# Patient Record
Sex: FEMALE | Race: WHITE | NOT HISPANIC OR LATINO | Employment: FULL TIME | URBAN - METROPOLITAN AREA
[De-identification: names, ages, dates, MRNs, and addresses within clinical notes are randomized per-mention and may not be internally consistent; named-entity substitution may affect disease eponyms.]

---

## 2023-10-16 ENCOUNTER — TELEPHONE (OUTPATIENT)
Dept: RHEUMATOLOGY | Facility: CLINIC | Age: 63
End: 2023-10-16

## 2023-10-16 DIAGNOSIS — M05.741 RHEUMATOID ARTHRITIS INVOLVING BOTH HANDS WITH POSITIVE RHEUMATOID FACTOR (MULTI): Primary | ICD-10-CM

## 2023-10-16 DIAGNOSIS — M05.742 RHEUMATOID ARTHRITIS INVOLVING BOTH HANDS WITH POSITIVE RHEUMATOID FACTOR (MULTI): Primary | ICD-10-CM

## 2023-10-16 NOTE — TELEPHONE ENCOUNTER
Patient called for a refill on   Methotrexate 2.5mg #120 take 10 tablets by mouth weekly    Shilo Marino Federico [phone]

## 2023-10-17 DIAGNOSIS — M05.742 RHEUMATOID ARTHRITIS INVOLVING BOTH HANDS WITH POSITIVE RHEUMATOID FACTOR (MULTI): Primary | ICD-10-CM

## 2023-10-17 DIAGNOSIS — M05.741 RHEUMATOID ARTHRITIS INVOLVING BOTH HANDS WITH POSITIVE RHEUMATOID FACTOR (MULTI): Primary | ICD-10-CM

## 2023-10-18 DIAGNOSIS — M05.79 RHEUMATOID ARTHRITIS INVOLVING MULTIPLE SITES WITH POSITIVE RHEUMATOID FACTOR (MULTI): Primary | ICD-10-CM

## 2023-10-18 RX ORDER — FOLIC ACID 1 MG/1
1 TABLET ORAL DAILY
COMMUNITY
Start: 2020-09-09 | End: 2023-10-18 | Stop reason: SDUPTHER

## 2023-10-18 RX ORDER — METHOTREXATE 2.5 MG/1
TABLET ORAL
Qty: 12 TABLET | Refills: 0 | Status: SHIPPED | OUTPATIENT
Start: 2023-10-18 | End: 2023-11-29 | Stop reason: SDUPTHER

## 2023-10-18 RX ORDER — HYDROXYCHLOROQUINE SULFATE 200 MG/1
1 TABLET, FILM COATED ORAL
COMMUNITY
End: 2023-11-29 | Stop reason: SDUPTHER

## 2023-10-18 RX ORDER — METHOTREXATE 2.5 MG/1
TABLET ORAL
COMMUNITY
End: 2023-10-18 | Stop reason: SDUPTHER

## 2023-10-18 RX ORDER — PREDNISONE 10 MG/1
5 TABLET ORAL DAILY
COMMUNITY
End: 2024-04-17 | Stop reason: WASHOUT

## 2023-10-18 RX ORDER — PENICILLIN V POTASSIUM 500 MG/1
TABLET, FILM COATED ORAL
COMMUNITY
Start: 2022-11-16

## 2023-10-18 RX ORDER — IBUPROFEN 800 MG/1
800 TABLET ORAL EVERY 8 HOURS PRN
COMMUNITY
Start: 2022-11-16

## 2023-10-18 RX ORDER — FOLIC ACID 1 MG/1
1 TABLET ORAL DAILY
Qty: 90 TABLET | Refills: 0 | Status: SHIPPED | OUTPATIENT
Start: 2023-10-18 | End: 2024-01-16

## 2023-10-18 RX ORDER — AMOXICILLIN 500 MG/1
CAPSULE ORAL
COMMUNITY
Start: 2022-11-08

## 2023-10-18 RX ORDER — METHOTREXATE 2.5 MG/1
TABLET ORAL
Qty: 12 TABLET | Refills: 0 | Status: SHIPPED | OUTPATIENT
Start: 2023-10-18 | End: 2023-10-18 | Stop reason: SDUPTHER

## 2023-10-19 RX ORDER — METHOTREXATE 2.5 MG/1
TABLET ORAL
Qty: 120 TABLET | Refills: 0 | Status: SHIPPED | OUTPATIENT
Start: 2023-10-19 | End: 2024-01-17

## 2023-10-30 ENCOUNTER — APPOINTMENT (OUTPATIENT)
Dept: RHEUMATOLOGY | Facility: CLINIC | Age: 63
End: 2023-10-30

## 2023-11-29 ENCOUNTER — OFFICE VISIT (OUTPATIENT)
Dept: RHEUMATOLOGY | Facility: CLINIC | Age: 63
End: 2023-11-29

## 2023-11-29 DIAGNOSIS — M05.741 RHEUMATOID ARTHRITIS INVOLVING BOTH HANDS WITH POSITIVE RHEUMATOID FACTOR (MULTI): Primary | ICD-10-CM

## 2023-11-29 DIAGNOSIS — M05.742 RHEUMATOID ARTHRITIS INVOLVING BOTH HANDS WITH POSITIVE RHEUMATOID FACTOR (MULTI): Primary | ICD-10-CM

## 2023-11-29 PROCEDURE — 1036F TOBACCO NON-USER: CPT | Performed by: INTERNAL MEDICINE

## 2023-11-29 PROCEDURE — 99213 OFFICE O/P EST LOW 20 MIN: CPT | Performed by: INTERNAL MEDICINE

## 2023-11-29 RX ORDER — HYDROXYCHLOROQUINE SULFATE 200 MG/1
200 TABLET, FILM COATED ORAL 2 TIMES DAILY
Qty: 180 TABLET | Refills: 0 | Status: SHIPPED | OUTPATIENT
Start: 2023-11-29 | End: 2024-03-22 | Stop reason: SDUPTHER

## 2023-11-29 ASSESSMENT — ENCOUNTER SYMPTOMS
LOSS OF SENSATION IN FEET: 0
OCCASIONAL FEELINGS OF UNSTEADINESS: 0
DEPRESSION: 0

## 2023-11-29 NOTE — PATIENT INSTRUCTIONS
Take methotrexate 8 pills/week.  Continue hydroxychloroquine 1 pill twice a day.  Call me if any question.  Follow-up in 4 months.

## 2023-11-29 NOTE — PROGRESS NOTES
"Subjective   Patient ID: Thais Kenny is a 63 y.o. female who presents for Follow-up.    HPI.  63-year-old female with history of seropositive RA presented for follow-up.    She stated that she is feeling fine most of the time however once in a while notes mild soreness and stiffness in the hands.    Immunosuppression: Hydroxychloroquine and methotrexate (9/2020).     Review of Systems   All other systems reviewed and are negative.    Objective .      6/9/2021     3:00 PM 9/8/2021     3:40 PM 3/9/2022     3:03 PM 7/13/2022     3:04 PM 11/9/2022     2:51 PM 3/20/2023     3:36 PM 7/17/2023     3:22 PM   Vitals   Systolic 130 156 142 147  132 134   Diastolic 84 80 80 94  78 79   Heart Rate    91  89 96   Height (in) 1.6 m (5' 3\")    1.6 m (5' 3\") 1.6 m (5' 3\") 1.6 m (5' 3\")   Weight (lb) 248   248 245 246 255   BMI 43.93 kg/m2   43.93 kg/m2 43.4 kg/m2 43.58 kg/m2 45.17 kg/m2   BSA (m2) 2.23 m2   2.23 m2 2.22 m2 2.23 m2 2.27 m2      Physical Exam.  Gen. AAO x3, NAD.  HEENT: No pallor or icterus, PERRLA, EOMI. Oropharynx is clear. MM moist,Parotid glands  not enlarged. No cervical lymphadenopathy .  Skin: No rashes.  Heart: S1, S2/ RRR.   Lungs: CTA B.  Abdomen: Soft, NT/ND.  MSK: No swollen or tender joint.  Mild chronic synovial proliferation of the MCP joints with slight ulnar drifting of the digits at the MCP joints.  Handgrip excellent.   Neuro:  Sensation to touch intact.Strength 5/5 throughout.   Psych:Appropriate mood and behavior  EXT: No edema    Assessment/Plan     63-year-old female with history of seropositive RA presented for follow-up.    #1: Seropositive RA.  Chronic mild synovial proliferation of the MCP joints without active synovitis.  Labs obtained at [facility] about 1 week ago showed ESR of 31 and CRP 8.6.    -Discussed to take methotrexate 20 mg/week.  -Continue hydroxychloroquine twice a day.  Eye exam up to date.    Follow-up in 4 months.     This note was partially generated using the Dragon " Voice recognition system. There may be some incorrect wording, spelling and/or spelling errors or punctuation errors that were not corrected prior to committing the note to the medical record.    There is no problem list on file for this patient.     Past Surgical History:   Procedure Laterality Date    OTHER SURGICAL HISTORY  02/08/2019    Cholecystectomy laparoscopic      Social History     Tobacco Use    Smoking status: Never    Smokeless tobacco: Never   Substance Use Topics    Alcohol use: Not Currently     Comment: VERY RARE      No family history on file.   No Known Allergies   Current Outpatient Medications   Medication Instructions    amoxicillin (Amoxil) 500 mg capsule TAKE ONE CAPSULE BY MOUTH FOUR TIMES A DAY UNTIL GONE    folic acid (FOLVITE) 1 mg, oral, Daily    hydroxychloroquine (PLAQUENIL) 200 mg, oral, 2 times daily, Take with food.     mg, oral, Every 8 hours PRN    methotrexate (Trexall) 2.5 mg tablet TAKE 10 TABLETS BY MOUTH EVERY WEEK    penicillin v potassium (Veetid) 500 mg tablet TAKE ONE TABLET BY MOUTH FOUR TIMES A DAY UNTIL GONE    predniSONE (DELTASONE) 5 mg, oral, Daily

## 2024-01-30 ENCOUNTER — TELEPHONE (OUTPATIENT)
Dept: PRIMARY CARE | Facility: CLINIC | Age: 64
End: 2024-01-30

## 2024-01-30 DIAGNOSIS — M05.742 RHEUMATOID ARTHRITIS INVOLVING BOTH HANDS WITH POSITIVE RHEUMATOID FACTOR (MULTI): Primary | ICD-10-CM

## 2024-01-30 DIAGNOSIS — M05.741 RHEUMATOID ARTHRITIS INVOLVING BOTH HANDS WITH POSITIVE RHEUMATOID FACTOR (MULTI): Primary | ICD-10-CM

## 2024-01-30 RX ORDER — METHOTREXATE 2.5 MG/1
20 TABLET ORAL
Qty: 96 TABLET | Refills: 0 | Status: SHIPPED | OUTPATIENT
Start: 2024-01-30 | End: 2024-04-17 | Stop reason: SDUPTHER

## 2024-01-30 NOTE — TELEPHONE ENCOUNTER
LEYDA jack     Pt states she needs refill on Methotrexate    I dont see this in her med list - is she still taking this?

## 2024-03-22 ENCOUNTER — TELEPHONE (OUTPATIENT)
Dept: PRIMARY CARE | Facility: CLINIC | Age: 64
End: 2024-03-22

## 2024-03-22 DIAGNOSIS — M05.742 RHEUMATOID ARTHRITIS INVOLVING BOTH HANDS WITH POSITIVE RHEUMATOID FACTOR (MULTI): ICD-10-CM

## 2024-03-22 DIAGNOSIS — M05.741 RHEUMATOID ARTHRITIS INVOLVING BOTH HANDS WITH POSITIVE RHEUMATOID FACTOR (MULTI): ICD-10-CM

## 2024-03-22 RX ORDER — HYDROXYCHLOROQUINE SULFATE 200 MG/1
200 TABLET, FILM COATED ORAL 2 TIMES DAILY
Qty: 180 TABLET | Refills: 0 | Status: SHIPPED | OUTPATIENT
Start: 2024-03-22 | End: 2024-06-20

## 2024-03-22 NOTE — TELEPHONE ENCOUNTER
Patient called for a refill on    Hydroxychloroquine 200mg #180 take one tablet 2 times a day with food    Shilo Marino [phone]

## 2024-04-17 ENCOUNTER — OFFICE VISIT (OUTPATIENT)
Dept: RHEUMATOLOGY | Facility: CLINIC | Age: 64
End: 2024-04-17

## 2024-04-17 VITALS — WEIGHT: 255 LBS | BODY MASS INDEX: 45.17 KG/M2

## 2024-04-17 DIAGNOSIS — M05.741 RHEUMATOID ARTHRITIS INVOLVING BOTH HANDS WITH POSITIVE RHEUMATOID FACTOR (MULTI): ICD-10-CM

## 2024-04-17 DIAGNOSIS — M05.742 RHEUMATOID ARTHRITIS INVOLVING BOTH HANDS WITH POSITIVE RHEUMATOID FACTOR (MULTI): ICD-10-CM

## 2024-04-17 PROCEDURE — 99213 OFFICE O/P EST LOW 20 MIN: CPT | Performed by: INTERNAL MEDICINE

## 2024-04-17 PROCEDURE — 1036F TOBACCO NON-USER: CPT | Performed by: INTERNAL MEDICINE

## 2024-04-17 RX ORDER — METHOTREXATE 2.5 MG/1
20 TABLET ORAL
Qty: 96 TABLET | Refills: 0 | Status: SHIPPED | OUTPATIENT
Start: 2024-04-21 | End: 2024-07-20

## 2024-04-17 NOTE — PATIENT INSTRUCTIONS
Continue hydroxychloroquine and methotrexate as prescribed.  Call me if any question.  Follow-up in 4 months.

## 2024-04-17 NOTE — PROGRESS NOTES
Subjective  . Thais Kenny is a 63 y.o. female who presents for     Manish Gonzalez MD  Physician Specialty: Rheumatology   Pro and New Patient Visit.    HPI.63-year-old female with history of seropositive RA and positive MAURISIO presented for follow-up.   He stated that she is feeling fine.  She has no complaints.    Immunosuppression: Hydroxychloroquine and methotrexate (9/2020).       Review of Systems   All other systems reviewed and are negative.    Objective     Weight 116 kg (255 lb).    Physical Exam.  Gen. AAO x3, NAD.  HEENT: No pallor or icterus, PERRLA, EOMI. Parotid glands  not enlarged. No cervical lymphadenopathy .  Skin: No rashes.  Heart: S1, S2/ RRR.   Lungs: CTA B.  Abdomen: Soft, NT/ND, BS regular.  MSK: No swollen or tender joint.  Neuro: Sensation to touch intact.Strength 5/5 throughout.   Psych:Appropriate mood and behavior  EXT: No edema       Assessment/Plan  . 63-year-old female with history of seropositive RA and positive MAURISIO presented for follow-up.     #1: Seropositive RA.  Stable.  -Continue methotrexate 20 mg/week.  -Continue hydroxychloroquine twice a day.  -Labs reviewed.    Follow-up in 4 months.     This note was partially generated using the Dragon Voice recognition system. There may be some incorrect wording, spelling and/or spelling errors or punctuation errors that were not corrected prior to committing the note to the medical record.    Problem List Items Addressed This Visit    None  Visit Diagnoses       Rheumatoid arthritis involving both hands with positive rheumatoid factor (Multi)        Relevant Medications    methotrexate (Trexall) 2.5 mg tablet (Start on 4/21/2024)                 Active Ambulatory Problems     Diagnosis Date Noted    No Active Ambulatory Problems     Resolved Ambulatory Problems     Diagnosis Date Noted    No Resolved Ambulatory Problems     Past Medical History:   Diagnosis Date    Personal history of other diseases of the musculoskeletal system and  connective tissue        No family history on file.    Past Surgical History:   Procedure Laterality Date    OTHER SURGICAL HISTORY  02/08/2019    Cholecystectomy laparoscopic       Social History     Tobacco Use   Smoking Status Never   Smokeless Tobacco Never       Allergies  Patient has no known allergies.    Current Meds  Current Outpatient Medications   Medication Instructions    amoxicillin (Amoxil) 500 mg capsule TAKE ONE CAPSULE BY MOUTH FOUR TIMES A DAY UNTIL GONE    hydroxychloroquine (PLAQUENIL) 200 mg, oral, 2 times daily, Take with food.     mg, oral, Every 8 hours PRN    [START ON 4/21/2024] methotrexate (TREXALL) 20 mg, oral, Once Weekly, Follow directions carefully, and ask to explain any part you do not understand. Take exactly as directed.    penicillin v potassium (Veetid) 500 mg tablet TAKE ONE TABLET BY MOUTH FOUR TIMES A DAY UNTIL GONE        Lab Results   Component Value Date    ANATITER 1:320 03/18/2019    SEDRATE 18 03/18/2019    CRP 1.77 (A) 03/18/2019    DNADS <1.0 03/18/2019             Manish Gonzalez MD

## 2024-06-21 ENCOUNTER — TELEPHONE (OUTPATIENT)
Dept: RHEUMATOLOGY | Facility: CLINIC | Age: 64
End: 2024-06-21

## 2024-06-21 DIAGNOSIS — M05.742 RHEUMATOID ARTHRITIS INVOLVING BOTH HANDS WITH POSITIVE RHEUMATOID FACTOR (MULTI): ICD-10-CM

## 2024-06-21 DIAGNOSIS — M05.741 RHEUMATOID ARTHRITIS INVOLVING BOTH HANDS WITH POSITIVE RHEUMATOID FACTOR (MULTI): ICD-10-CM

## 2024-06-21 NOTE — TELEPHONE ENCOUNTER
Patient called for a refill on    Methotrexate 2.5mg #96 take 8 tablets one time a week per week. Follow directions carefully/    Plaquenil 200mg # 180 take one tablet by mouth twice a day with food    Shilo Caballero [phone]

## 2024-06-24 RX ORDER — HYDROXYCHLOROQUINE SULFATE 200 MG/1
200 TABLET, FILM COATED ORAL 2 TIMES DAILY
Qty: 180 TABLET | Refills: 0 | Status: SHIPPED | OUTPATIENT
Start: 2024-06-24 | End: 2024-09-22

## 2024-06-24 RX ORDER — METHOTREXATE 2.5 MG/1
20 TABLET ORAL
Qty: 96 TABLET | Refills: 0 | Status: SHIPPED | OUTPATIENT
Start: 2024-06-30 | End: 2024-09-28

## 2024-09-17 ENCOUNTER — APPOINTMENT (OUTPATIENT)
Dept: RHEUMATOLOGY | Facility: CLINIC | Age: 64
End: 2024-09-17

## 2024-09-17 VITALS
OXYGEN SATURATION: 96 % | DIASTOLIC BLOOD PRESSURE: 73 MMHG | BODY MASS INDEX: 45.89 KG/M2 | SYSTOLIC BLOOD PRESSURE: 152 MMHG | HEIGHT: 63 IN | HEART RATE: 93 BPM | WEIGHT: 259 LBS

## 2024-09-17 DIAGNOSIS — R76.8 POSITIVE ANA (ANTINUCLEAR ANTIBODY): ICD-10-CM

## 2024-09-17 DIAGNOSIS — M05.742 RHEUMATOID ARTHRITIS INVOLVING BOTH HANDS WITH POSITIVE RHEUMATOID FACTOR (MULTI): Primary | ICD-10-CM

## 2024-09-17 DIAGNOSIS — M05.741 RHEUMATOID ARTHRITIS INVOLVING BOTH HANDS WITH POSITIVE RHEUMATOID FACTOR (MULTI): Primary | ICD-10-CM

## 2024-09-17 PROCEDURE — 3008F BODY MASS INDEX DOCD: CPT | Performed by: INTERNAL MEDICINE

## 2024-09-17 PROCEDURE — 99213 OFFICE O/P EST LOW 20 MIN: CPT | Performed by: INTERNAL MEDICINE

## 2024-09-17 PROCEDURE — 1036F TOBACCO NON-USER: CPT | Performed by: INTERNAL MEDICINE

## 2024-09-17 RX ORDER — METHOTREXATE 2.5 MG/1
20 TABLET ORAL
Qty: 96 TABLET | Refills: 1 | Status: SHIPPED | OUTPATIENT
Start: 2024-09-22 | End: 2024-12-21

## 2024-09-17 RX ORDER — METHYLPREDNISOLONE 4 MG/1
TABLET ORAL
Qty: 21 TABLET | Refills: 0 | Status: SHIPPED | OUTPATIENT
Start: 2024-09-17 | End: 2024-09-24

## 2024-09-17 RX ORDER — HYDROXYCHLOROQUINE SULFATE 200 MG/1
200 TABLET, FILM COATED ORAL 2 TIMES DAILY
Qty: 180 TABLET | Refills: 1 | Status: SHIPPED | OUTPATIENT
Start: 2024-09-17 | End: 2024-12-16

## 2024-09-17 NOTE — PROGRESS NOTES
"Subjective  . Thais Kenny is a 63 y.o. female who presents for Follow-up ([facility] - Medication refills).    HPI. 63-year-old female with history of seropositive RA and +ve MAURISIO presented for follow-up.     She is overall doing well.  Once in a while she feels mild soreness in the hands.  No recent RA flares.  She is tolerating current immunosuppressive therapy.    Immunosuppression: Hydroxychloroquine and methotrexate (9/2020).     Review of Systems   All other systems reviewed and are negative.    Objective     Blood pressure 152/73, pulse 93, height 1.6 m (5' 3\"), weight 117 kg (259 lb), SpO2 96%.    Physical Exam.  Gen. AAO x3, NAD.  HEENT: No pallor or icterus, PERRLA, EOMI. No cervical lymphadenopathy .  Skin: No rashes.  Heart: S1, S2/ RRR. No murmurs or gallops.  Lungs: CTA B.  Abdomen: Soft, NT/ND.  MSK: No.swelling or tenderness of the  upper or lower extremity joints.  Neck,spine and Danial SI with out tenderness.  Neuro: Sensation to touch intact.Strength 5/5 throughout.   Psych:Appropriate mood and behavior  EXT: No edema    Assessment/Plan . 63-year-old female with history of seropositive RA and +ve MAURISIO presented for follow-up.     #1: Seropositive RA.  Appears in remission.  -Continue methotrexate 20 mg/week.  -Continue hydroxychloroquine twice a day.  Annual eye exam discussed.  -Blood work done outside [facility] system reviewed.    #2: Positive MAURISIO.    Follow-up in 4 months.     This note was partially generated using the Dragon Voice recognition system. There may be some incorrect wording, spelling and/or spelling errors or punctuation errors that were not corrected prior to committing the note to the medical record.      Problem List Items Addressed This Visit    None  Visit Diagnoses       Rheumatoid arthritis involving both hands with positive rheumatoid factor (Multi)    -  Primary    Relevant Medications    hydroxychloroquine (Plaquenil) 200 mg tablet    methotrexate (Trexall) 2.5 mg tablet (Start on " 9/22/2024)    methylPREDNISolone (Medrol Dospak) 4 mg tablets                 Active Ambulatory Problems     Diagnosis Date Noted    No Active Ambulatory Problems     Resolved Ambulatory Problems     Diagnosis Date Noted    No Resolved Ambulatory Problems     Past Medical History:   Diagnosis Date    Personal history of other diseases of the musculoskeletal system and connective tissue        No family history on file.    Past Surgical History:   Procedure Laterality Date    OTHER SURGICAL HISTORY  02/08/2019    Cholecystectomy laparoscopic       Social History     Tobacco Use   Smoking Status Never   Smokeless Tobacco Never       Allergies  Patient has no known allergies.    Current Meds  Current Outpatient Medications   Medication Instructions    amoxicillin (Amoxil) 500 mg capsule TAKE ONE CAPSULE BY MOUTH FOUR TIMES A DAY UNTIL GONE    hydroxychloroquine (PLAQUENIL) 200 mg, oral, 2 times daily, Take with food.     mg, oral, Every 8 hours PRN    [START ON 9/22/2024] methotrexate (TREXALL) 20 mg, oral, Once Weekly, Follow directions carefully, and ask to explain any part you do not understand. Take exactly as directed.    methylPREDNISolone (Medrol Dospak) 4 mg tablets Follow schedule on package instructions    penicillin v potassium (Veetid) 500 mg tablet TAKE ONE TABLET BY MOUTH FOUR TIMES A DAY UNTIL GONE        Lab Results   Component Value Date    ANATITER 1:320 03/18/2019    SEDRATE 18 03/18/2019    CRP 1.77 (A) 03/18/2019    DNADS <1.0 03/18/2019             Manish Gonzalez MD

## 2024-12-17 DIAGNOSIS — M05.741 RHEUMATOID ARTHRITIS INVOLVING BOTH HANDS WITH POSITIVE RHEUMATOID FACTOR (MULTI): ICD-10-CM

## 2024-12-17 DIAGNOSIS — M05.742 RHEUMATOID ARTHRITIS INVOLVING BOTH HANDS WITH POSITIVE RHEUMATOID FACTOR (MULTI): ICD-10-CM

## 2024-12-17 RX ORDER — HYDROXYCHLOROQUINE SULFATE 200 MG/1
200 TABLET, FILM COATED ORAL 2 TIMES DAILY
Qty: 180 TABLET | Refills: 1 | Status: SHIPPED | OUTPATIENT
Start: 2024-12-17 | End: 2025-03-17

## 2024-12-17 RX ORDER — METHOTREXATE 2.5 MG/1
20 TABLET ORAL
Qty: 96 TABLET | Refills: 1 | Status: SHIPPED | OUTPATIENT
Start: 2024-12-22 | End: 2025-03-22

## 2025-01-20 ENCOUNTER — APPOINTMENT (OUTPATIENT)
Dept: RHEUMATOLOGY | Facility: CLINIC | Age: 65
End: 2025-01-20

## 2025-01-20 VITALS — BODY MASS INDEX: 45.17 KG/M2 | DIASTOLIC BLOOD PRESSURE: 75 MMHG | SYSTOLIC BLOOD PRESSURE: 135 MMHG | WEIGHT: 255 LBS

## 2025-01-20 DIAGNOSIS — R76.8 POSITIVE ANA (ANTINUCLEAR ANTIBODY): ICD-10-CM

## 2025-01-20 DIAGNOSIS — M05.742 RHEUMATOID ARTHRITIS INVOLVING BOTH HANDS WITH POSITIVE RHEUMATOID FACTOR (MULTI): Primary | ICD-10-CM

## 2025-01-20 DIAGNOSIS — M05.741 RHEUMATOID ARTHRITIS INVOLVING BOTH HANDS WITH POSITIVE RHEUMATOID FACTOR (MULTI): Primary | ICD-10-CM

## 2025-01-20 PROCEDURE — 99213 OFFICE O/P EST LOW 20 MIN: CPT | Performed by: INTERNAL MEDICINE

## 2025-01-20 PROCEDURE — 1036F TOBACCO NON-USER: CPT | Performed by: INTERNAL MEDICINE

## 2025-01-20 NOTE — PROGRESS NOTES
Subjective . Thais Kenny is a 64 y.o. female who presents for EPV.    HPI. 64-year-old female with history of seropositive RA and +ve MAURISIO presented for follow-up.     She is feeling fine.  She has no joint pain, swelling or morning stiffness.    Immunosuppression: Hydroxychloroquine and methotrexate (9/2020).     Review of Systems   All other systems reviewed and are negative.    Objective     Blood pressure 135/75, weight 116 kg (255 lb).    Physical Exam.  Gen. AAO x3, NAD.  HEENT: No pallor or icterus, PERRLA, EOMI. No cervical lymphadenopathy .  Skin: No rashes.  Heart: S1, S2/ RRR.   Lungs: CTA B.  Abdomen: Soft, NT/ND.  MSK: No swollen or tender joint.  Neuro: Sensation to touch intact.Strength 5/5 throughout.   Psych:Appropriate mood and behavior  EXT: No edema    Assessment/Plan .    #1: Seropositive RA.  In remission.  -Discussed to take hydroxychloroquine once a day for 1 month then stop.  -Continue methotrexate 20 mg/week.  -Continue folic acid 1 mg daily.  -Recent labs reviewed.    #2: Positive MAURISIO.     Follow-up in 3 months.     This note was partially generated using the Dragon Voice recognition system. There may be some incorrect wording, spelling and/or spelling errors or punctuation errors that were not corrected prior to committing the note to the medical record.          Problem List Items Addressed This Visit    None  Visit Diagnoses       Rheumatoid arthritis involving both hands with positive rheumatoid factor (Multi)    -  Primary    Relevant Orders    CBC    C-Reactive Protein    Sedimentation Rate    Comprehensive Metabolic Panel    Positive MAURISIO (antinuclear antibody)        Relevant Orders    CBC    C-Reactive Protein    Sedimentation Rate    Comprehensive Metabolic Panel                 Active Ambulatory Problems     Diagnosis Date Noted    No Active Ambulatory Problems     Resolved Ambulatory Problems     Diagnosis Date Noted    No Resolved Ambulatory Problems     Past Medical  History:   Diagnosis Date    Personal history of other diseases of the musculoskeletal system and connective tissue        No family history on file.    Past Surgical History:   Procedure Laterality Date    OTHER SURGICAL HISTORY  02/08/2019    Cholecystectomy laparoscopic       Social History     Tobacco Use   Smoking Status Never   Smokeless Tobacco Never       Allergies  Patient has no known allergies.    Current Meds  Current Outpatient Medications   Medication Instructions    amoxicillin (Amoxil) 500 mg capsule TAKE ONE CAPSULE BY MOUTH FOUR TIMES A DAY UNTIL GONE    hydroxychloroquine (PLAQUENIL) 200 mg, oral, 2 times daily, Take with food.     mg, Every 8 hours PRN    methotrexate (TREXALL) 20 mg, oral, Once Weekly, Follow directions carefully, and ask to explain any part you do not understand. Take exactly as directed.    penicillin v potassium (Veetid) 500 mg tablet TAKE ONE TABLET BY MOUTH FOUR TIMES A DAY UNTIL GONE        Lab Results   Component Value Date    ANATITER 1:320 03/18/2019    SEDRATE 18 03/18/2019    CRP 1.77 (A) 03/18/2019    DNADS <1.0 03/18/2019             Manish Gonzalez MD

## 2025-01-20 NOTE — PATIENT INSTRUCTIONS
Take hydroxychloroquine 1 pill daily for 1 month then stop.  Continue methotrexate as prescribed.  Call me if any question.  Follow-up in 3 months.

## 2025-04-02 ENCOUNTER — PATIENT MESSAGE (OUTPATIENT)
Dept: RHEUMATOLOGY | Facility: CLINIC | Age: 65
End: 2025-04-02

## 2025-04-02 DIAGNOSIS — M05.741 RHEUMATOID ARTHRITIS INVOLVING BOTH HANDS WITH POSITIVE RHEUMATOID FACTOR (MULTI): ICD-10-CM

## 2025-04-02 DIAGNOSIS — M05.742 RHEUMATOID ARTHRITIS INVOLVING BOTH HANDS WITH POSITIVE RHEUMATOID FACTOR (MULTI): ICD-10-CM

## 2025-04-02 RX ORDER — METHOTREXATE 2.5 MG/1
20 TABLET ORAL
Qty: 96 TABLET | Refills: 0 | Status: SHIPPED | OUTPATIENT
Start: 2025-04-06 | End: 2025-07-05

## 2025-04-15 DIAGNOSIS — M05.741 RHEUMATOID ARTHRITIS INVOLVING BOTH HANDS WITH POSITIVE RHEUMATOID FACTOR (MULTI): ICD-10-CM

## 2025-04-15 DIAGNOSIS — M05.742 RHEUMATOID ARTHRITIS INVOLVING BOTH HANDS WITH POSITIVE RHEUMATOID FACTOR (MULTI): ICD-10-CM

## 2025-04-15 RX ORDER — HYDROXYCHLOROQUINE SULFATE 200 MG/1
200 TABLET, FILM COATED ORAL 2 TIMES DAILY
Qty: 60 TABLET | Refills: 0 | Status: SHIPPED | OUTPATIENT
Start: 2025-04-15 | End: 2025-07-14

## 2025-06-23 ENCOUNTER — APPOINTMENT (OUTPATIENT)
Dept: RHEUMATOLOGY | Facility: CLINIC | Age: 65
End: 2025-06-23

## 2025-06-24 ENCOUNTER — APPOINTMENT (OUTPATIENT)
Dept: RHEUMATOLOGY | Facility: CLINIC | Age: 65
End: 2025-06-24

## 2025-07-01 ENCOUNTER — APPOINTMENT (OUTPATIENT)
Dept: RHEUMATOLOGY | Facility: CLINIC | Age: 65
End: 2025-07-01